# Patient Record
Sex: FEMALE | Race: WHITE | NOT HISPANIC OR LATINO | Employment: STUDENT | URBAN - METROPOLITAN AREA
[De-identification: names, ages, dates, MRNs, and addresses within clinical notes are randomized per-mention and may not be internally consistent; named-entity substitution may affect disease eponyms.]

---

## 2017-05-03 ENCOUNTER — HOSPITAL ENCOUNTER (EMERGENCY)
Facility: HOSPITAL | Age: 22
Discharge: HOME | End: 2017-05-03
Payer: COMMERCIAL

## 2017-05-03 VITALS
HEART RATE: 74 BPM | BODY MASS INDEX: 28.32 KG/M2 | OXYGEN SATURATION: 98 % | TEMPERATURE: 99 F | SYSTOLIC BLOOD PRESSURE: 150 MMHG | WEIGHT: 150 LBS | HEIGHT: 61 IN | DIASTOLIC BLOOD PRESSURE: 86 MMHG | RESPIRATION RATE: 16 BRPM

## 2017-05-03 DIAGNOSIS — H10.9 CONJUNCTIVITIS, RIGHT EYE: Primary | ICD-10-CM

## 2017-05-03 DIAGNOSIS — H57.10 EYE PAIN: ICD-10-CM

## 2017-05-03 PROCEDURE — 99283 EMERGENCY DEPT VISIT LOW MDM: CPT

## 2017-05-03 PROCEDURE — A9270 NON-COVERED ITEM OR SERVICE: HCPCS | Performed by: PHYSICIAN ASSISTANT

## 2017-05-03 RX ORDER — PROPARACAINE HYDROCHLORIDE 5 MG/ML
1 SOLUTION/ DROPS OPHTHALMIC ONCE
Status: COMPLETED | OUTPATIENT
Start: 2017-05-03 | End: 2017-05-03

## 2017-05-03 RX ORDER — OFLOXACIN 3 MG/ML
1 SOLUTION/ DROPS OPHTHALMIC ONCE
Status: COMPLETED | OUTPATIENT
Start: 2017-05-03 | End: 2017-05-03

## 2017-05-03 RX ADMIN — FLUORESCEIN SODIUM 1 STRIP: 1 STRIP OPHTHALMIC at 16:18

## 2017-05-03 RX ADMIN — PROPARACAINE HYDROCHLORIDE 1 DROP: 5 SOLUTION/ DROPS OPHTHALMIC at 16:18

## 2017-05-03 RX ADMIN — OFLOXACIN 1 DROP: 3 SOLUTION/ DROPS OPHTHALMIC at 16:56

## 2018-03-30 ENCOUNTER — DOCUMENTATION (OUTPATIENT)
Dept: AUDIOLOGY | Age: 23
End: 2018-03-30

## 2018-03-30 NOTE — PROGRESS NOTES
[facility] Phonak Virto V50 ean  Sn: [id number] (left)   Invoice: [phone]   Warranty: 2/25/2019  -remade Jimenez, replaced faceplate  -increase vent size   Programmed  R+L aid are included in box  Left message on Monica's cell and spoke to [facility]  Jax Mullins has an appt on 4/6/2018- Jax Mullins will keep the appointment if adjustments are necessary and cancel if not

## 2019-10-29 ENCOUNTER — DOCUMENTATION (OUTPATIENT)
Dept: AUDIOLOGY | Age: 24
End: 2019-10-29

## 2019-10-29 NOTE — PROGRESS NOTES
Progress Note    Name:  Logan Moran  :  1995  Age:  26 y o    Date of Evaluation: 10/29/19     Scanned documents        Madelyn Richardson   Clinical Audiologist

## 2020-02-28 NOTE — PROGRESS NOTES
Progress Note    Name:  Nestor Howe  :  1995  Age:  22 y o  Date of Evaluation: 20     Scanned in HA chart part 2         Madelyn Clark , CCC-A  Clinical Audiologist